# Patient Record
Sex: MALE | Race: WHITE | URBAN - METROPOLITAN AREA
[De-identification: names, ages, dates, MRNs, and addresses within clinical notes are randomized per-mention and may not be internally consistent; named-entity substitution may affect disease eponyms.]

---

## 2020-10-26 ENCOUNTER — WEB ENCOUNTER (OUTPATIENT)
Dept: URBAN - METROPOLITAN AREA CLINIC 74 | Facility: CLINIC | Age: 40
End: 2020-10-26

## 2020-10-26 ENCOUNTER — OFFICE VISIT (OUTPATIENT)
Dept: URBAN - METROPOLITAN AREA CLINIC 73 | Facility: CLINIC | Age: 40
End: 2020-10-26
Payer: SELF-PAY

## 2020-10-26 ENCOUNTER — OFFICE VISIT (OUTPATIENT)
Dept: URBAN - METROPOLITAN AREA CLINIC 74 | Facility: CLINIC | Age: 40
End: 2020-10-26
Payer: SELF-PAY

## 2020-10-26 DIAGNOSIS — R14.0 BLOATING SYMPTOM: ICD-10-CM

## 2020-10-26 DIAGNOSIS — R10.12 LEFT UPPER QUADRANT ABDOMINAL PAIN: ICD-10-CM

## 2020-10-26 DIAGNOSIS — R76.8 ABNORMAL HEPATITIS SEROLOGY: ICD-10-CM

## 2020-10-26 DIAGNOSIS — R76.8 POSITIVE HELICOBACTER PYLORI SEROLOGY: ICD-10-CM

## 2020-10-26 PROBLEM — 301715003: Status: ACTIVE | Noted: 2020-10-26

## 2020-10-26 PROCEDURE — G8427 DOCREV CUR MEDS BY ELIG CLIN: HCPCS | Performed by: STUDENT IN AN ORGANIZED HEALTH CARE EDUCATION/TRAINING PROGRAM

## 2020-10-26 PROCEDURE — G8420 CALC BMI NORM PARAMETERS: HCPCS | Performed by: STUDENT IN AN ORGANIZED HEALTH CARE EDUCATION/TRAINING PROGRAM

## 2020-10-26 PROCEDURE — 83014 H PYLORI DRUG ADMIN: CPT | Performed by: STUDENT IN AN ORGANIZED HEALTH CARE EDUCATION/TRAINING PROGRAM

## 2020-10-26 PROCEDURE — 83013 H PYLORI (C-13) BREATH: CPT | Performed by: STUDENT IN AN ORGANIZED HEALTH CARE EDUCATION/TRAINING PROGRAM

## 2020-10-26 PROCEDURE — G9903 PT SCRN TBCO ID AS NON USER: HCPCS | Performed by: STUDENT IN AN ORGANIZED HEALTH CARE EDUCATION/TRAINING PROGRAM

## 2020-10-26 PROCEDURE — G8484 FLU IMMUNIZE NO ADMIN: HCPCS | Performed by: STUDENT IN AN ORGANIZED HEALTH CARE EDUCATION/TRAINING PROGRAM

## 2020-10-26 PROCEDURE — 99213 OFFICE O/P EST LOW 20 MIN: CPT | Performed by: STUDENT IN AN ORGANIZED HEALTH CARE EDUCATION/TRAINING PROGRAM

## 2020-10-26 NOTE — HPI-TODAY'S VISIT:
40-year-old male No significant past medical or surgical history. Patient reports that he went into [facility] and underwent routine checkup.  He did this because he was having some nonspecific abdominal discomfort.  His test were all normal except for positive serology for Helicobacter pylori infection.  Patient was treated for same with 3 rounds of 2 weeks of antibiotic treatment with every time a different regimen.  Patient completed all antibiotics as prescribed but unfortunately every time they rechecked, his blood test were still positive for Helicobacter pylori infection. Patient currently reports a lot of bloating with flatus and burping.  Patient also said that sometimes he gets pain in his abdomen which is migratory in nature, sharp in character, improves after he passes gas or burps.  Patient denies any nausea or vomiting.  No reflux or dysphagia.  No constipation or diarrhea.  No blood in the stool.  Overall good appetite and denies any unintentional weight loss. Patient currently using probiotic. No NSAID use. No smoking or alcohol abuse. No prior EGD or colonoscopy. No family history of GI malignancy.

## 2020-11-06 ENCOUNTER — DASHBOARD ENCOUNTERS (OUTPATIENT)
Age: 40
End: 2020-11-06

## 2020-11-09 ENCOUNTER — OFFICE VISIT (OUTPATIENT)
Dept: URBAN - METROPOLITAN AREA CLINIC 74 | Facility: CLINIC | Age: 40
End: 2020-11-09
Payer: SELF-PAY

## 2020-11-09 DIAGNOSIS — R14.0 BLOATING SYMPTOM: ICD-10-CM

## 2020-11-09 DIAGNOSIS — R76.8 POSITIVE HELICOBACTER PYLORI SEROLOGY: ICD-10-CM

## 2020-11-09 PROBLEM — 445514005: Status: ACTIVE | Noted: 2020-10-26

## 2020-11-09 PROCEDURE — G8484 FLU IMMUNIZE NO ADMIN: HCPCS | Performed by: STUDENT IN AN ORGANIZED HEALTH CARE EDUCATION/TRAINING PROGRAM

## 2020-11-09 PROCEDURE — G9903 PT SCRN TBCO ID AS NON USER: HCPCS | Performed by: STUDENT IN AN ORGANIZED HEALTH CARE EDUCATION/TRAINING PROGRAM

## 2020-11-09 PROCEDURE — G8427 DOCREV CUR MEDS BY ELIG CLIN: HCPCS | Performed by: STUDENT IN AN ORGANIZED HEALTH CARE EDUCATION/TRAINING PROGRAM

## 2020-11-09 PROCEDURE — G8420 CALC BMI NORM PARAMETERS: HCPCS | Performed by: STUDENT IN AN ORGANIZED HEALTH CARE EDUCATION/TRAINING PROGRAM

## 2020-11-09 PROCEDURE — 99213 OFFICE O/P EST LOW 20 MIN: CPT | Performed by: STUDENT IN AN ORGANIZED HEALTH CARE EDUCATION/TRAINING PROGRAM

## 2020-11-09 NOTE — HPI-TODAY'S VISIT:
40-year-old male Past medical and surgical history reviewed. Patient comes in for follow-up visit. Patient Helicobacter pylori urea breath test was negative. Patient currently denies any specific GI complaints.  Denies any abdominal pain, nausea, vomiting, constipation, diarrhea or blood in stool.  Overall good appetite and denies unintentional weight loss. Reports intermittent bloating with gas-like pain in the abdomen which is migratory in nature and improves after he burps or passes flatus.  This is intermittent and rare.  Otherwise no other significant symptoms. No family history of GI malignancy. No NSAID use. No use of anticoagulation.